# Patient Record
Sex: MALE | Race: WHITE | NOT HISPANIC OR LATINO | Employment: STUDENT | URBAN - METROPOLITAN AREA
[De-identification: names, ages, dates, MRNs, and addresses within clinical notes are randomized per-mention and may not be internally consistent; named-entity substitution may affect disease eponyms.]

---

## 2017-12-26 ENCOUNTER — OFFICE VISIT (OUTPATIENT)
Dept: FAMILY MEDICINE | Facility: CLINIC | Age: 27
End: 2017-12-26
Payer: COMMERCIAL

## 2017-12-26 VITALS
DIASTOLIC BLOOD PRESSURE: 71 MMHG | TEMPERATURE: 97.4 F | SYSTOLIC BLOOD PRESSURE: 119 MMHG | RESPIRATION RATE: 18 BRPM | HEART RATE: 64 BPM | WEIGHT: 177 LBS | OXYGEN SATURATION: 98 % | HEIGHT: 69 IN | BODY MASS INDEX: 26.22 KG/M2

## 2017-12-26 DIAGNOSIS — Z23 NEED FOR VACCINATION: ICD-10-CM

## 2017-12-26 DIAGNOSIS — Z31.41 FERTILITY TESTING: ICD-10-CM

## 2017-12-26 DIAGNOSIS — J45.990 EXERCISE-INDUCED ASTHMA: ICD-10-CM

## 2017-12-26 DIAGNOSIS — J30.2 CHRONIC SEASONAL ALLERGIC RHINITIS, UNSPECIFIED TRIGGER: Primary | ICD-10-CM

## 2017-12-26 DIAGNOSIS — Z23 NEED FOR PROPHYLACTIC VACCINATION AND INOCULATION AGAINST INFLUENZA: ICD-10-CM

## 2017-12-26 DIAGNOSIS — R41.840 ATTENTION DEFICIT: ICD-10-CM

## 2017-12-26 PROCEDURE — 90715 TDAP VACCINE 7 YRS/> IM: CPT | Performed by: FAMILY MEDICINE

## 2017-12-26 PROCEDURE — 90472 IMMUNIZATION ADMIN EACH ADD: CPT | Performed by: FAMILY MEDICINE

## 2017-12-26 PROCEDURE — 90686 IIV4 VACC NO PRSV 0.5 ML IM: CPT | Performed by: FAMILY MEDICINE

## 2017-12-26 PROCEDURE — 90471 IMMUNIZATION ADMIN: CPT | Performed by: FAMILY MEDICINE

## 2017-12-26 PROCEDURE — 99203 OFFICE O/P NEW LOW 30 MIN: CPT | Mod: 25 | Performed by: FAMILY MEDICINE

## 2017-12-26 RX ORDER — MONTELUKAST SODIUM 10 MG/1
10 TABLET ORAL AT BEDTIME
Qty: 30 TABLET | Refills: 11 | Status: SHIPPED | OUTPATIENT
Start: 2017-12-26

## 2017-12-26 RX ORDER — ALBUTEROL SULFATE 90 UG/1
2 AEROSOL, METERED RESPIRATORY (INHALATION) EVERY 6 HOURS PRN
Qty: 3 INHALER | Refills: 3 | Status: SHIPPED | OUTPATIENT
Start: 2017-12-26

## 2017-12-26 NOTE — PROGRESS NOTES
"  SUBJECTIVE:   Nicholas Porter is a 27 year old male who presents to clinic today for the following health issues:      Pt is here to discuss some allergies issues he has been having. - He went to the allergist and was told he was allergic to pollen. He has some nasal congestion now. He has taken zyrtec D. He also tried flonase spray. It helped, but during the night his nose was clogged.    Pt would also like to discuss some cancer screening options.   No family history of colon or prostate cancer. His grandpa passed away from lung cancer in his 50's. His dad then got it too and  recently.    Does have exercise induced asthma - is getting low on his inhaler. Plays basketball once a night with work league. Goes to the gym daily. Finds that he has symptoms with certain activities, not all, but albuterol helps.    His wife and he are considering having children, wonders about having his sperm count checked.    He had surgery on his ankle after his senior year in high school in . He had an extra bone in the back of his heel that was causing pain. That injured his right leg from the knee down as far as sensation when he had the nerve block. He can't feel the bottom of his right foot, rather it feels different. That seems to have gotten worse. He wonders about that.    He switched jobs about a year ago. He switched over to a desk job. He notes he has difficulty focusing and wonders about ADD.     ROS  No fever  No chills  No cough  No diarrhea  No constipation  No rashes, moles under right eye.  No anxiety or depression - some sadness about recent loss of his dad.  No palpitations    EXAM:  /71  Pulse 64  Temp 97.4  F (36.3  C) (Oral)  Resp 18  Ht 5' 9\" (1.753 m)  Wt 177 lb (80.3 kg)  SpO2 98%  BMI 26.14 kg/m2  Constitutional: Healthy, alert, no distress   EENT: Nasal congestion  Cardiovascular: RRR. No murmurs   Respiratory: Clear to auscultation   Skin: minimal erythema of right lateral lower " eyelid  Musculoskeletal: no tenderness of knees bilaterally   Psych: mood good, affect appropriate, insight and judgment good, function good.    ASSESSMENT    ICD-10-CM    1. Chronic seasonal allergic rhinitis, unspecified trigger J30.2 montelukast (SINGULAIR) 10 MG tablet   2. Fertility testing Z31.41 UROLOGY ADULT REFERRAL   3. Exercise-induced asthma J45.990 albuterol (PROAIR HFA/PROVENTIL HFA/VENTOLIN HFA) 108 (90 BASE) MCG/ACT Inhaler   4. Attention deficit R41.840 MENTAL HEALTH REFERRAL  - Adult; Assessments and Testing; ADHD; Other: [facility] [phone]; We will contact you to schedule the appointment or please call with any questions   5. Need for vaccination Z23 TDAP VACCINE (ADACEL)     EA ADD'L VACCINE   6. Need for prophylactic vaccination and inoculation against influenza Z23 FLU VAC, SPLIT VIRUS IM > 3 YO (QUADRIVALENT) [96838]     Vaccine Administration, Initial [01429]      Plan:  Patient Instructions   Humidifier in the bedroom 30-40% humidity, change filter monthly.  Cold air humidifier not vaporizer.     Start singulair, can take with zyrtec. May add back in flonase as well.  Could consider ENT consult if not resolved with singulair.    Will reach out to pulmonology regarding family history of lung cancer.    [phone] to schedule ADD testing.     Joshua Villalpando MD  Family Medicine Physician

## 2017-12-26 NOTE — PROGRESS NOTES

## 2017-12-26 NOTE — NURSING NOTE
Screening Questionnaire for Adult Immunization    Are you sick today?   No   Do you have allergies to medications, food, a vaccine component or latex?   No   Have you ever had a serious reaction after receiving a vaccination?   No   Do you have a long-term health problem with heart disease, lung disease, asthma, kidney disease, metabolic disease (e.g. diabetes), anemia, or other blood disorder?   No   Do you have cancer, leukemia, HIV/AIDS, or any other immune system problem?   No   In the past 3 months, have you taken medications that affect  your immune system, such as prednisone, other steroids, or anticancer drugs; drugs for the treatment of rheumatoid arthritis, Crohn s disease, or psoriasis; or have you had radiation treatments?   No   Have you had a seizure, or a brain or other nervous system problem?   No   During the past year, have you received a transfusion of blood or blood     products, or been given immune (gamma) globulin or antiviral drug?   No   For women: Are you pregnant or is there a chance you could become        pregnant during the next month?   No   Have you received any vaccinations in the past 4 weeks?   No     Immunization questionnaire answers were all negative.        Per orders of Dr. Lester Villalpando , injection of tdap given by Meg Lopez. Patient instructed to remain in clinic for 15 minutes afterwards, and to report any adverse reaction to me immediately.       Screening performed by Meg Lopez on 12/26/2017 at 11:15 AM.  Per orders of Dr. Lester Villalpando, injection of tdap given by Meg Lopez MA. Patient instructed to remain in clinic for 15 minutes afterwards, and to report any adverse reaction to me immediately.  Prior to injection verified patient identity using patient's name and date of birth.

## 2017-12-26 NOTE — PATIENT INSTRUCTIONS
Humidifier in the bedroom 30-40% humidity, change filter monthly.  Cold air humidifier not vaporizer.     Start singulair, can take with zyrtec. May add back in flonase as well.  Could consider ENT consult if not resolved with singulair.    Will reach out to pulmonology regarding family history of lung cancer.    [phone] to schedule ADD testing.

## 2017-12-26 NOTE — MR AVS SNAPSHOT
After Visit Summary   12/26/2017    Nicholas Porter    MRN: [id number]           Patient Information     Date Of Birth          1990        Visit Information        Provider Department      12/26/2017 9:15 AM Joshua Burrell MD [facility]        Today's Diagnoses     Fertility testing    -  1    Exercise-induced asthma        Chronic seasonal allergic rhinitis, unspecified trigger        Attention deficit          Care Instructions    Humidifier in the bedroom 30-40% humidity, change filter monthly.  Cold air humidifier not vaporizer.     Start singulair, can take with zyrtec. May add back in flonase as well.  Could consider ENT consult if not resolved with singulair.    Will reach out to pulmonology regarding family history of lung cancer.    [phone] to schedule ADD testing.          Follow-ups after your visit        Additional Services     UROLOGY ADULT REFERRAL       Your provider has referred you to: [facility] [place] [facility] - [place] [phone]   [email or web address]    Please be aware that coverage of these services is subject to the terms and limitations of your health insurance plan.  Call member services at your health plan with any benefit or coverage questions.      Please bring the following with you to your appointment:    (1) Any X-Rays, CTs or MRIs which have been performed.  Contact the facility where they were done to arrange for  prior to your scheduled appointment.    (2) List of current medications  (3) This referral request   (4) Any documents/labs given to you for this referral                  Who to contact     If you have questions or need follow up information about today's clinic visit or your schedule please contact [facility] directly at [phone].  Normal or non-critical lab and imaging results will be communicated to you by MyChart, letter or phone within 4  "business days after the clinic has received the results. If you do not hear from us within 7 days, please contact the clinic through [employer]ool[employer] or phone. If you have a critical or abnormal lab result, we will notify you by phone as soon as possible.  Submit refill requests through [employer] or call your pharmacy and they will forward the refill request to us. Please allow 3 business days for your refill to be completed.          Additional Information About Your Visit        Tucoola Information     Tucoola lets you send messages to your doctor, view your test results, renew your prescriptions, schedule appointments and more. To sign up, go to [email or web address] . Click on \"Log in\" on the left side of the screen, which will take you to the Welcome page. Then click on \"Sign up Now\" on the right side of the page.     You will be asked to enter the access code listed below, as well as some personal information. Please follow the directions to create your username and password.     Your access code is: [email or web address]  Expires: 3/26/2018 10:35 AM     Your access code will  in 90 days. If you need help or a new code, please call your [facility] clinic or [phone].        Care EveryWhere ID     This is your Care EveryWhere ID. This could be used by other organizations to access your [facility] medical records  [id number]        Your Vitals Were     Pulse Temperature Respirations Height Pulse Oximetry BMI (Body Mass Index)    64 97.4  F (36.3  C) (Oral) 18 5' 9\" (1.753 m) 98% 26.14 kg/m2       Blood Pressure from Last 3 Encounters:   17 119/71   14 107/67   13 101/60    Weight from Last 3 Encounters:   17 177 lb (80.3 kg)   14 168 lb (76.2 kg)              We Performed the Following     UROLOGY ADULT REFERRAL          Today's Medication Changes          These changes are accurate as of: 17 10:35 AM.  If you have any questions, ask your nurse or doctor.               Start taking " these medicines.        Dose/Directions    albuterol 108 (90 BASE) MCG/ACT Inhaler   Commonly known as:  PROAIR HFA/PROVENTIL HFA/VENTOLIN HFA   Used for:  Exercise-induced asthma   Started by:  Joshua Burrell MD        Dose:  2 puff   Inhale 2 puffs into the lungs every 6 hours as needed for shortness of breath / dyspnea or wheezing   Quantity:  3 Inhaler   Refills:  3       montelukast 10 MG tablet   Commonly known as:  SINGULAIR   Used for:  Chronic seasonal allergic rhinitis, unspecified trigger   Started by:  Joshua Burrell MD        Dose:  10 mg   Take 1 tablet (10 mg) by mouth At Bedtime   Quantity:  30 tablet   Refills:  11            Where to get your medicines      These medications were sent to [facility] - [place], [place] - [address]  [address], [place] [address]     Phone:  [phone]     albuterol 108 (90 BASE) MCG/ACT Inhaler    montelukast 10 MG tablet                Primary Care Provider Office Phone # Fax #    Ruperto Gonzales [phone]       [facility] [address] [facility] [address]        Equal Access to Services     RAHUL MARIE AH: Hadii alphonse ku hadasho Soomaali, waaxda luqadaha, qaybta kaalmada adeegyada, waxay janein hayflores pearson. So [facility] [phone].    ATENCIÓN: Si habla español, tiene a siddiqui disposición servicios gratuitos de asistencia lingüística. [facility] [phone].    We comply with applicable federal civil rights laws and [place] laws. We do not discriminate on the basis of race, color, national origin, age, disability, sex, sexual orientation, or gender identity.            Thank you!     Thank you for choosing [facility]  for your care. Our goal is always to provide you with excellent care. Hearing back from our patients is one way we can continue to improve our services. Please take a few minutes to complete the written survey that you may  receive in the mail after your visit with us. Thank you!             Your Updated Medication List - Protect others around you: Learn how to safely use, store and throw away your medicines at www.disposemymeds.org.          This list is accurate as of: 12/26/17 10:35 AM.  Always use your most recent med list.                   Brand Name Dispense Instructions for use Diagnosis    albuterol 108 (90 BASE) MCG/ACT Inhaler    PROAIR HFA/PROVENTIL HFA/VENTOLIN HFA    3 Inhaler    Inhale 2 puffs into the lungs every 6 hours as needed for shortness of breath / dyspnea or wheezing    Exercise-induced asthma       ALLEGRA PO      Take 30 mg by mouth daily        montelukast 10 MG tablet    SINGULAIR    30 tablet    Take 1 tablet (10 mg) by mouth At Bedtime    Chronic seasonal allergic rhinitis, unspecified trigger

## 2017-12-27 ASSESSMENT — ASTHMA QUESTIONNAIRES: ACT_TOTALSCORE: 21

## 2018-02-28 ENCOUNTER — OFFICE VISIT (OUTPATIENT)
Dept: FAMILY MEDICINE | Facility: CLINIC | Age: 28
End: 2018-02-28
Payer: COMMERCIAL

## 2018-02-28 VITALS
RESPIRATION RATE: 18 BRPM | WEIGHT: 183.2 LBS | TEMPERATURE: 97.7 F | BODY MASS INDEX: 27.13 KG/M2 | DIASTOLIC BLOOD PRESSURE: 71 MMHG | SYSTOLIC BLOOD PRESSURE: 119 MMHG | HEIGHT: 69 IN | HEART RATE: 58 BPM

## 2018-02-28 DIAGNOSIS — H69.93 DYSFUNCTION OF BOTH EUSTACHIAN TUBES: Primary | ICD-10-CM

## 2018-02-28 PROCEDURE — 99213 OFFICE O/P EST LOW 20 MIN: CPT | Performed by: FAMILY MEDICINE

## 2018-02-28 NOTE — PROGRESS NOTES
"    SUBJECTIVE:   Nicholas Porter is a 27 year old male who presents to clinic today for the following health issues:      Ear pain      Duration: 2 weeks    Description  nasal congestion and ear pain right, cough     Severity: moderate    Accompanying signs and symptoms: None    History (predisposing factors):  none    Precipitating or alleviating factors: None    Therapies tried and outcome:  rest and fluids    He will be moving for a couple of years for work to [place].    He did start a new medication for allergies and did have some improvement.    Did go to the [facility] for a honeymoon and had some improvement in his symptoms.    Tried humidification of ear which didn't help.    Did have a cough for a couple of days. Did have a little nasal discharge at the time of the could.    EXAM:  /71  Pulse 58  Temp 97.7  F (36.5  C) (Oral)  Resp 18  Ht 5' 9\" (1.753 m)  Wt 183 lb 3.2 oz (83.1 kg)  BMI 27.05 kg/m2  Constitutional: Healthy, alert, no distress   EENT: fluid in middle ears, TMs not erythematous, minimal erythema of oropharynx, nasal congestion  Cardiovascular: RRR. No murmurs   Respiratory: Clear to auscultation     ASSESSMENT    ICD-10-CM    1. Dysfunction of both eustachian tubes H69.83       Plan:  Patient Instructions   Be thoughtful about chewing gum or the like with pressure changes with flights.  Continue antihistamine and singulair.  Nasal saline mist.  Consider Guaifenisen 400-600mg twice daily to loosen secretions.      Joshua Villalpando MD  Family Medicine Physician     "

## 2018-02-28 NOTE — MR AVS SNAPSHOT
After Visit Summary   2/28/2018    Nicholas Porter    MRN: [id number]           Patient Information     Date Of Birth          1990        Visit Information        Provider Department      2/28/2018 4:00 PM Joshua Burrell MD [facility]        Care Instructions    Be thoughtful about chewing gum or the like with pressure changes with flights.  Continue antihistamine and singulair.  Nasal saline mist.  Consider Guaifenisen 400-600mg twice daily to loosen secretions.           Follow-ups after your visit        Who to contact     If you have questions or need follow up information about today's clinic visit or your schedule please contact [facility] directly at [phone].  Normal or non-critical lab and imaging results will be communicated to you by O[employer] VR[employer], letter or phone within 4 business days after the clinic has received the results. If you do not hear from us within 7 days, please contact the clinic through O[employer] VRhart or phone. If you have a critical or abnormal lab result, we will notify you by phone as soon as possible.  Submit refill requests through [email or web address] or call your pharmacy and they will forward the refill request to us. Please allow 3 business days for your refill to be completed.          Additional Information About Your Visit        MyChart Information     [email or web address] gives you secure access to your electronic health record. If you see a primary care provider, you can also send messages to your care team and make appointments. If you have questions, please call your primary care clinic.  If you do not have a primary care provider, please call [phone] and they will assist you.        Care EveryWhere ID     This is your Care EveryWhere ID. This could be used by other organizations to access your [facility] medical records  [id number]        Your Vitals Were     Pulse Temperature Respirations Height BMI (Body Mass Index)       58  "97.7  F (36.5  C) (Oral) 18 5' 9\" (1.753 m) 27.05 kg/m2        Blood Pressure from Last 3 Encounters:   02/28/18 119/71   12/26/17 119/71   06/21/14 107/67    Weight from Last 3 Encounters:   02/28/18 183 lb 3.2 oz (83.1 kg)   12/26/17 177 lb (80.3 kg)   06/21/14 168 lb (76.2 kg)              Today, you had the following     No orders found for display       Primary Care Provider Office Phone # Fax #    Ruperto Gonzales [phone]       [facility] [address] [facility] [address]        Equal Access to Services     RAHUL MARIE : Anabel kingo Somaddie, waaxda luqadaha, qaybta kaalmada adeegyada, tianna pearson. So [facility] [place] [facility] [phone].    ATENCIÓN: Si habla español, tiene a siddiqui disposición servicios gratuitos de asistencia lingüística. [facility] [phone].    We comply with applicable federal civil rights laws and [place] laws. We do not discriminate on the basis of race, color, national origin, age, disability, sex, sexual orientation, or gender identity.            Thank you!     Thank you for choosing [facility]  for your care. Our goal is always to provide you with excellent care. Hearing back from our patients is one way we can continue to improve our services. Please take a few minutes to complete the written survey that you may receive in the mail after your visit with us. Thank you!             Your Updated Medication List - Protect others around you: Learn how to safely use, store and throw away your medicines at www.disposemymeds.org.          This list is accurate as of 2/28/18  4:52 PM.  Always use your most recent med list.                   Brand Name Dispense Instructions for use Diagnosis    albuterol 108 (90 BASE) MCG/ACT Inhaler    PROAIR HFA/PROVENTIL HFA/VENTOLIN HFA    3 Inhaler    Inhale 2 puffs into the lungs every 6 hours as needed for shortness of breath / dyspnea or wheezing    Exercise-induced asthma "       ALLEGRA PO      Take 30 mg by mouth daily        montelukast 10 MG tablet    SINGULAIR    30 tablet    Take 1 tablet (10 mg) by mouth At Bedtime    Chronic seasonal allergic rhinitis, unspecified trigger

## 2018-02-28 NOTE — PATIENT INSTRUCTIONS
Be thoughtful about chewing gum or the like with pressure changes with flights.  Continue antihistamine and singulair.  Nasal saline mist.  Consider Guaifenisen 400-600mg twice daily to loosen secretions.

## 2020-03-02 ENCOUNTER — HEALTH MAINTENANCE LETTER (OUTPATIENT)
Age: 30
End: 2020-03-02